# Patient Record
Sex: MALE | URBAN - METROPOLITAN AREA
[De-identification: names, ages, dates, MRNs, and addresses within clinical notes are randomized per-mention and may not be internally consistent; named-entity substitution may affect disease eponyms.]

---

## 2019-08-14 ENCOUNTER — MOHS SURGERY-ROUTINE (OUTPATIENT)
Dept: URBAN - METROPOLITAN AREA CLINIC 12 | Facility: CLINIC | Age: 59
Setting detail: DERMATOLOGY
End: 2019-08-14

## 2022-07-04 RX ORDER — ATORVASTATIN CALCIUM 40 MG/1
TABLET, FILM COATED ORAL
COMMUNITY
Start: 2022-04-25

## 2022-07-04 RX ORDER — CELECOXIB 200 MG/1
CAPSULE ORAL
COMMUNITY
Start: 2022-01-06 | End: 2022-10-22

## 2022-07-08 RX ORDER — SILDENAFIL 100 MG/1
TABLET, FILM COATED ORAL
COMMUNITY
Start: 2022-04-25 | End: 2022-10-03 | Stop reason: SDUPTHER

## 2022-07-08 RX ORDER — GABAPENTIN 300 MG/1
1 CAPSULE ORAL
COMMUNITY

## 2022-09-21 PROBLEM — E78.5 HYPERLIPIDEMIA: Status: ACTIVE | Noted: 2022-09-21

## 2022-09-21 PROBLEM — E11.59 TYPE 2 DIABETES MELLITUS WITH CIRCULATORY DISORDER (HCC): Status: ACTIVE | Noted: 2022-09-21

## 2022-09-21 PROBLEM — N52.9 ERECTILE DYSFUNCTION: Status: ACTIVE | Noted: 2022-09-21

## 2022-09-21 PROBLEM — E34.9 HYPOTESTOSTERONISM: Status: ACTIVE | Noted: 2022-09-21

## 2022-09-28 PROBLEM — N52.9 MALE ERECTILE DYSFUNCTION, UNSPECIFIED: Status: ACTIVE | Noted: 2022-09-28

## 2022-09-28 PROBLEM — E03.9 HYPOTHYROIDISM: Status: ACTIVE | Noted: 2022-09-28

## 2022-09-28 PROBLEM — M48.061 LUMBAR SPINAL STENOSIS: Status: ACTIVE | Noted: 2022-09-28

## 2022-09-28 PROBLEM — M51.26 DISPLACEMENT OF LUMBAR INTERVERTEBRAL DISC WITHOUT MYELOPATHY: Status: ACTIVE | Noted: 2022-09-28

## 2022-09-28 PROBLEM — I10 ESSENTIAL HYPERTENSION: Status: ACTIVE | Noted: 2022-09-28

## 2022-09-28 PROBLEM — G57.93 NEUROPATHY OF BOTH FEET: Status: ACTIVE | Noted: 2022-09-28

## 2022-09-28 PROBLEM — M15.9 OSTEOARTHRITIS OF MULTIPLE JOINTS: Status: ACTIVE | Noted: 2022-09-28

## 2022-09-28 PROBLEM — Z96.653 HISTORY OF TOTAL BILATERAL KNEE REPLACEMENT: Status: ACTIVE | Noted: 2022-09-28

## 2022-09-28 PROBLEM — G89.29 OTHER CHRONIC PAIN: Status: ACTIVE | Noted: 2022-09-28

## 2022-09-28 PROBLEM — C44.91 BCC (BASAL CELL CARCINOMA): Status: ACTIVE | Noted: 2020-05-01

## 2022-09-28 PROBLEM — M23.50 CHRONIC INSTABILITY OF KNEE: Status: ACTIVE | Noted: 2022-09-28

## 2022-09-28 PROBLEM — E29.1 HYPOGONADISM IN MALE: Status: ACTIVE | Noted: 2020-11-11

## 2022-09-28 PROBLEM — M54.30 SCIATICA: Status: ACTIVE | Noted: 2022-09-28

## 2022-09-28 PROBLEM — G47.00 INSOMNIA: Status: ACTIVE | Noted: 2020-03-20

## 2022-09-28 PROBLEM — E11.9 DIABETES MELLITUS (HCC): Status: ACTIVE | Noted: 2022-09-28

## 2022-09-28 PROBLEM — T84.018A FAILED TOTAL KNEE ARTHROPLASTY (HCC): Status: ACTIVE | Noted: 2020-05-01

## 2022-09-28 PROBLEM — Z96.659 FAILED TOTAL KNEE ARTHROPLASTY (HCC): Status: ACTIVE | Noted: 2020-05-01

## 2022-09-28 PROBLEM — Z96.659 ARTIFICIAL KNEE JOINT PRESENT: Status: ACTIVE | Noted: 2022-09-28

## 2022-09-28 PROBLEM — J01.40 ACUTE NON-RECURRENT PANSINUSITIS: Status: ACTIVE | Noted: 2022-09-28

## 2022-09-28 PROBLEM — M19.90 ARTHRITIS: Status: ACTIVE | Noted: 2022-09-28

## 2022-09-28 PROBLEM — M16.11 PRIMARY OSTEOARTHRITIS OF RIGHT HIP: Status: ACTIVE | Noted: 2022-09-28

## 2023-03-02 ENCOUNTER — APPOINTMENT (OUTPATIENT)
Dept: URBAN - METROPOLITAN AREA SURGERY 15 | Age: 63
Setting detail: DERMATOLOGY
End: 2023-03-03

## 2023-03-02 PROBLEM — C44.712 BASAL CELL CARCINOMA OF SKIN OF RIGHT LOWER LIMB, INCLUDING HIP: Status: ACTIVE | Noted: 2023-03-02

## 2023-03-02 PROCEDURE — 17315 MOHS SURG ADDL BLOCK: CPT

## 2023-03-02 PROCEDURE — OTHER SURGICAL DECISION MAKING: OTHER

## 2023-03-02 PROCEDURE — OTHER MOHS SURGERY: OTHER

## 2023-03-02 PROCEDURE — 17313 MOHS 1 STAGE T/A/L: CPT

## 2023-03-02 PROCEDURE — 17314 MOHS ADDL STAGE T/A/L: CPT

## 2023-03-02 PROCEDURE — OTHER COUNSELING: OTHER

## 2023-03-02 NOTE — PROCEDURE: MOHS SURGERY
spouse Keystone Flap Text: Given the location of the defect, the surrounding tension, to facilitate healing, and the shape of the defect, a keystone flap was planned.  Using a sterile surgical marker, an appropriate keystone flap was drawn incorporating the defect, outlining the appropriate donor tissue and placing the expected incisions within the relaxed skin tension lines where possible. The area thus outlined was incised deep to adipose tissue with a #15 scalpel blade.  The skin margins were undermined to an appropriate distance in all directions around the primary defect and laterally outward around the flap utilizing iris scissors.  The wound edges were sutured in a layered fashion.

## 2023-08-16 ENCOUNTER — APPOINTMENT (OUTPATIENT)
Dept: URBAN - METROPOLITAN AREA SURGERY 15 | Age: 63
Setting detail: DERMATOLOGY
End: 2023-08-16

## 2023-08-16 PROBLEM — C44.712 BASAL CELL CARCINOMA OF SKIN OF RIGHT LOWER LIMB, INCLUDING HIP: Status: ACTIVE | Noted: 2023-08-16

## 2023-08-16 PROBLEM — C44.719 BASAL CELL CARCINOMA OF SKIN OF LEFT LOWER LIMB, INCLUDING HIP: Status: ACTIVE | Noted: 2023-08-16

## 2023-08-16 PROCEDURE — OTHER COUNSELING: OTHER

## 2023-08-16 PROCEDURE — OTHER MOHS SURGERY: OTHER

## 2023-08-16 PROCEDURE — 17313 MOHS 1 STAGE T/A/L: CPT | Mod: 76

## 2023-08-16 PROCEDURE — 17314 MOHS ADDL STAGE T/A/L: CPT

## 2023-08-16 PROCEDURE — 17313 MOHS 1 STAGE T/A/L: CPT

## 2023-08-16 PROCEDURE — OTHER SURGICAL DECISION MAKING: OTHER

## 2023-08-16 PROCEDURE — OTHER MIPS QUALITY: OTHER

## 2023-08-16 NOTE — PROCEDURE: MOHS SURGERY
Sae Ruelas Afx Histology Text: There were bizarre pleomorphic spindle-shaped cells along with atypical xanthomatized histiocytes

## 2023-08-16 NOTE — PROCEDURE: SURGICAL DECISION MAKING
Complexity (Necessary For Coding; Major - 90 Day Global With Some Exceptions; Minor - 10 Day Global): major
Initial Decision For Surgery?: Yes
Date Of Surgery - Today Or Tomorrow?: today
Risk Assessment Explanation (Does Not Render In The Note): Clinical determination of the probability and/or consequences of an event, such as surgery. Clinical assessment of the level of risk is affected by the nature of the event under consideration for the patient. Modifier 57 is used to indicate an Evaluation and Management (E/M) service resulted in the initial decision to perform surgery either the day before a major surgery (90 day global) or the day of a major surgery.
Discussion: After the mohs procedure was complete, a separate and distinct evaluation and management was performed for the resultant surgical defect for potential reconstruction using flap or graft.  Complications and risk of morbidity of each were discussed including (but not limited to) scarring, which could distort a free anatomic margin of the eyelid, nose, ear or lip.

## 2023-08-16 NOTE — PROCEDURE: MOHS SURGERY
Body Location Override (Optional - Billing Will Still Be Based On Selected Body Map Location If Applicable): left proximal pretibial

## 2023-08-16 NOTE — PROCEDURE: MOHS SURGERY
Unique Flap 2 Text: Because of the full-thickness nature of the defect and to reduce risk for alar rim retraction, and after discussion of options and risk for alar retraction with this repair, a free cartilage graft was planned.  An incision through the anterior antihelix was made and the skin lifted in the periochondrial plane.  A square of cartilage sized to fit defect. Winlevi Counseling:  I discussed with the patient the risks of topical clascoterone including but not limited to erythema, scaling, itching, and stinging. Patient voiced their understanding.

## 2023-10-24 ENCOUNTER — APPOINTMENT (OUTPATIENT)
Dept: URBAN - METROPOLITAN AREA SURGERY 15 | Age: 63
Setting detail: DERMATOLOGY
End: 2023-10-24

## 2023-10-24 DIAGNOSIS — D485 NEOPLASM OF UNCERTAIN BEHAVIOR OF SKIN: ICD-10-CM

## 2023-10-24 PROBLEM — C44.712 BASAL CELL CARCINOMA OF SKIN OF RIGHT LOWER LIMB, INCLUDING HIP: Status: ACTIVE | Noted: 2023-10-24

## 2023-10-24 PROBLEM — D48.5 NEOPLASM OF UNCERTAIN BEHAVIOR OF SKIN: Status: ACTIVE | Noted: 2023-10-24

## 2023-10-24 PROCEDURE — OTHER COUNSELING: OTHER

## 2023-10-24 PROCEDURE — 17314 MOHS ADDL STAGE T/A/L: CPT

## 2023-10-24 PROCEDURE — 11103 TANGNTL BX SKIN EA SEP/ADDL: CPT

## 2023-10-24 PROCEDURE — 17313 MOHS 1 STAGE T/A/L: CPT

## 2023-10-24 PROCEDURE — OTHER MOHS SURGERY: OTHER

## 2023-10-24 PROCEDURE — 17313 MOHS 1 STAGE T/A/L: CPT | Mod: 76

## 2023-10-24 PROCEDURE — 88331 PATH CONSLTJ SURG 1 BLK 1SPC: CPT | Mod: 59

## 2023-10-24 PROCEDURE — 99213 OFFICE O/P EST LOW 20 MIN: CPT

## 2023-10-24 PROCEDURE — 11102 TANGNTL BX SKIN SINGLE LES: CPT | Mod: 59

## 2023-10-24 PROCEDURE — OTHER SURGICAL DECISION MAKING: OTHER

## 2023-10-24 PROCEDURE — OTHER BIOPSY BY SHAVE METHOD WITH FROZEN SECTION: OTHER

## 2023-10-24 ASSESSMENT — LOCATION DETAILED DESCRIPTION DERM: LOCATION DETAILED: RIGHT DISTAL PRETIBIAL REGION

## 2023-10-24 ASSESSMENT — LOCATION ZONE DERM: LOCATION ZONE: LEG

## 2023-10-24 ASSESSMENT — LOCATION SIMPLE DESCRIPTION DERM: LOCATION SIMPLE: RIGHT PRETIBIAL REGION

## 2023-10-24 NOTE — PROCEDURE: MOHS SURGERY
Allie FRANCISCO @[id number]   Cheiloplasty (Complex) Text: A decision was made to reconstruct the defect with a  cheiloplasty.  The defect was undermined extensively.  Additional orbicularis oris muscle was excised with a 15 blade scalpel.  The defect was converted into a full thickness wedge to facilite a better cosmetic result.  Small vessels were then tied off with 5-0 monocyrl. The orbicularis oris, superficial fascia, adipose and dermis were then reapproximated.  After the deeper layers were approximated the epidermis was reapproximated with particular care given to realign the vermilion border.

## 2023-10-24 NOTE — PROCEDURE: MOHS SURGERY
This ibuprofen dose is too much for her.    Max 200 mg 3 times a day for 3 days     Needs to call the surgeon for different pain med but not Ibuprofen like meds    Scc Desmoplastic Subtype Histology Text: There were aggregates of squamous cells in a desmoplastic stroma.

## 2023-10-24 NOTE — PROCEDURE: MOHS SURGERY
Body Location Override (Optional - Billing Will Still Be Based On Selected Body Map Location If Applicable): right Calf Distal

## 2023-10-24 NOTE — PROCEDURE: SURGICAL DECISION MAKING
Risk Assessment Explanation (Does Not Render In The Note): Clinical determination of the probability and/or consequences of an event, such as surgery. Clinical assessment of the level of risk is affected by the nature of the event under consideration for the patient. Modifier 57 is used to indicate an Evaluation and Management (E/M) service resulted in the initial decision to perform surgery either the day before a major surgery (90 day global) or the day of a major surgery.
Identified Risk Factors Documented?: yes
Discussion: After the mohs procedure was complete, a separate and distinct evaluation and management was performed for the resultant surgical defect for potential reconstruction using flap or graft.  Complications and risk of morbidity of each were discussed including (but not limited to) scarring, which could distort a free anatomic margin of the eyelid, nose, ear or lip.
Initial Decision For Surgery?: No
Date Of Surgery - Today Or Tomorrow?: today
Complexity (Necessary For Coding; Major - 90 Day Global With Some Exceptions; Minor - 10 Day Global): major

## 2023-10-24 NOTE — PROCEDURE: MOHS SURGERY
Patient has reviewed their COVID-19 lab results via [facility]   Dressing (No Sutures): dry sterile dressing

## 2023-10-24 NOTE — PROCEDURE: BIOPSY BY SHAVE METHOD WITH FROZEN SECTION
Pigmented Seborrheic Keratosis Histology Text: Benign proliferation of epidermal keratinocytes with hyperkeratosis and horn cysts.
X Size Of Lesion In Cm (Optional): 0
Wound Care: Petrolatum
Epidermal Inclusion Cyst Histology Text: Cystic structure containing ining composed of a flattened epithelium.
Bcc Adenoid Histology Text: There were numerous aggregates of basaloid cells demonstrating an adenoid pattern.
Depth Of Biopsy: dermis
Scc Crateriform Histology Text: Squamous epithelial cells arising from the epidermis and extending into the dermis, with crateriform pattern.
Basaloid Carcinoma Histology Text: There were numerous aggregates of basaloid cells.
Actinic Keratosis Histology Text: Atypia of the basilar layer of the epidermis, with parakeratosis.
Afx Histology Text: Dermal pleomorphic spindle cells with mitoses.
Number Of Blocks: 1
Bcc  Nodular Histology Text: There were numerous aggregates of basaloid cells demonstrating a nodular pattern.
Scc Desmoplastic Subtype Histology Text: Desmoplastic squamous epithelial cells arising from the epidermis and extending into the dermis.
Mixed Superficial And Nodular Bcc Histology Text: There were numerous aggregates of basaloid cells demonstrating a nodular and superficial pattern.
Bcc  Nodulocystic Histology Text: There were numerous aggregates of basaloid cells demonstrating a nodulocystic pattern.
Fibroepithelioma Of Pinkus Histology Text: Thin anastomosing strands of basaloid cells consistent with fibroepithelioma of Pinkus.
Mixed Nodular And Micronodular Bcc Histology Text: There were numerous aggregates of basaloid cells demonstrating a nodular and micronodularl pattern.
Billing Type: Third-Party Bill
Basosquamous Cell Carcinoma Histology Text: Aggregates of basaloid cells with areas of squamous differentiation.
Render Path Notes In Note?: No
Detail Level: Detailed
Folliculitis Histology Text: Perifollicular inflammation.
Hemostasis: Aluminum Chloride
Consent: Written consent was obtained and risks were reviewed including but not limited to scarring, infection, bleeding, scabbing, incomplete removal, nerve damage and allergy to anesthesia.
Scc In Situ With Follicular Extension Histology Text: Full-thickness atypia of keratinocytes in the epidermis, extending down along adnexal structures.
Scc In Situ Histology Text: Full-thickness atypia of keratinocytes in the epidermis, with no invasion of dermis seen in the sections examined.
Verruca Vulgaris Histology Text: Papillomatous epidermis with hypergranulomatosis and overlying tiers of parakeratosis.
Bcc Micronodular Histology Text: There were numerous aggregates of basaloid cells demonstrating a micronodular pattern.
Scc Histology Text: Squamous epithelial cells arising from the epidermis and extending into the dermis.
Scc Well Differentiated Histology Text: Well-differentiated squamous epithelial cells arising from the epidermis and extending into the dermis.
Bcc Superficial Histology Text: There were numerous aggregates of basaloid cells demonstrating a superficial pattern.
Biopsy Method: Katie williamson
Scc Moderately Differentiated Histology Text: Moderately differentiated squamous epithelial cells arising from the epidermis and extending into the dermis.
Biopsy Type: Frozen Section
Scar Histology Text: Thickened dermal collagen consistent with scar.
Scc Sarcomatoid Histology Text: Squamous epithelial cells arising from the epidermis and extending into the dermis in a sarcomatous pattern.
Mixed Nodular And Infiltrative Bcc Histology Text: There were numerous aggregates of basaloid cells demonstrating a nodular and infiltrative pattern.
Compound Nevus With Mild Atypia Histology Text: Nests of melanocytes are found at the dermoepidermal junction and within the dermis, with mild atypia.
Scc Ka Subtype Histology Text: Glassy squamous epithelial cells arising from the epidermis and extending into the dermis, in a keratoacanthoma pattern.
Bcc Keratotic Histology Text: There were numerous aggregates of basaloid cells demonstrating a keratotic pattern.
Anesthesia Type: 1% lidocaine with 1:100,000 epinephrine and a 1:10 solution of 8.4% sodium bicarbonate
Scc Poorly Differentiated Histology Text: Poorly squamous epithelial cells arising from the epidermis and extending into the dermis.
Scc Spindle Histology Text: Spindle-shaped squamous epithelial cells arising from the epidermis and extending into the dermis.
Bcc Infiltrative Histology Text: There were numerous aggregates of basaloid cells demonstrating an infiltrative pattern.
Inflamed Seborrheic Keratosis Histology Text: Benign proliferation of epidermal keratinocytes with hyperkeratosis, horn cysts, and lymphocytes.
Enhanced Features Information: The enhanced features will allow you to make use of the built in histology library. In this enhanced mode you will not have to select a frozen section diagnosis as this will automatically be based on the chosen impression. The parent diagnosis will also be overridden if you select a different microscopic description. The enhanced features will allow you develop a small library of gross descriptions to use as well. If you prefer the old method please leave the Use Enhanced Frozen Section Features question set to No.
Bcc Infundibulocystic Histology Text: There were numerous aggregates of basaloid cells demonstrating an infundibulocystic pattern.
Metatypical Bcc Histology Text: There were numerous aggregates of basaloid cells demonstrating a metatypical pattern.
Post-Care Instructions: I reviewed with the patient in detail post-care instructions. Patient is to keep the biopsy site bandage dry overnight, and then apply vaseline under occlusion daily until healed.
Use Enhanced Frozen Section Features: Yes
Notification Instructions: Patient notified of results in office on the same day.  If patient elected to leave office, they were advised to call later that day for results.
Processing Note: The specimen was frozen in the cryostat, sectioned and stained.
Body Location Override (Optional - Billing Will Still Be Based On Selected Body Map Location If Applicable): right calf Proximal
Merkel Cell Carcinoma Histology Text: Sheets of densely blue cells consistent with Merkel cell carcinoma.
Bcc  Morpheaform/Sclerosing Histology Text: There were numerous aggregates of basaloid cells demonstrating a morpheaform/sclerosing pattern.
Scc Acantholytic Histology Text: Squamous epithelial cells arising from the epidermis and extending into the dermis in an acantholytic pattern.
Body Location Override (Optional - Billing Will Still Be Based On Selected Body Map Location If Applicable): right calf Distal
Biopsy Method: Dermablade
Anesthesia Type: 1% lidocaine with epinephrine
Anesthesia Volume In Cc (Will Not Render If 0): 0.5
Hemostasis: Drysol
Post-Care Instructions: I reviewed with the patient in detail post-care instructions. Patient is to keep the biopsy site dry overnight, and then apply bacitracin twice daily until healed. Patient may apply hydrogen peroxide soaks to remove any crusting.
Notification Instructions: Patient will be notified of biopsy results. However, patient instructed to call the office if not contacted within 2 weeks.

## 2023-11-22 ENCOUNTER — RX ONLY (RX ONLY)
Age: 63
End: 2023-11-22

## 2023-11-22 RX ORDER — DOXYCYCLINE HYCLATE 100 MG/1
1 TABLET TABLET, COATED ORAL TWICE DAILY
Qty: 14 | Refills: 0 | Status: ACTIVE